# Patient Record
Sex: FEMALE | Race: BLACK OR AFRICAN AMERICAN | NOT HISPANIC OR LATINO
[De-identification: names, ages, dates, MRNs, and addresses within clinical notes are randomized per-mention and may not be internally consistent; named-entity substitution may affect disease eponyms.]

---

## 2017-04-17 ENCOUNTER — TRANSCRIPTION ENCOUNTER (OUTPATIENT)
Age: 62
End: 2017-04-17

## 2018-12-29 ENCOUNTER — TRANSCRIPTION ENCOUNTER (OUTPATIENT)
Age: 63
End: 2018-12-29

## 2019-01-08 ENCOUNTER — APPOINTMENT (OUTPATIENT)
Dept: OTOLARYNGOLOGY | Facility: CLINIC | Age: 64
End: 2019-01-08
Payer: COMMERCIAL

## 2019-01-08 VITALS
TEMPERATURE: 98.3 F | SYSTOLIC BLOOD PRESSURE: 159 MMHG | WEIGHT: 199 LBS | DIASTOLIC BLOOD PRESSURE: 93 MMHG | OXYGEN SATURATION: 98 % | RESPIRATION RATE: 17 BRPM | HEIGHT: 65 IN | HEART RATE: 106 BPM | BODY MASS INDEX: 33.15 KG/M2

## 2019-01-08 DIAGNOSIS — H92.02 OTALGIA, LEFT EAR: ICD-10-CM

## 2019-01-08 DIAGNOSIS — H75.82 OTHER SPECIFIED DISORDERS OF LEFT MIDDLE EAR AND MASTOID IN DISEASES CLASSIFIED ELSEWHERE: ICD-10-CM

## 2019-01-08 DIAGNOSIS — H91.92 UNSPECIFIED HEARING LOSS, LEFT EAR: ICD-10-CM

## 2019-01-08 DIAGNOSIS — H93.8X2 OTHER SPECIFIED DISORDERS OF LEFT EAR: ICD-10-CM

## 2019-01-08 DIAGNOSIS — Z86.39 PERSONAL HISTORY OF OTHER ENDOCRINE, NUTRITIONAL AND METABOLIC DISEASE: ICD-10-CM

## 2019-01-08 DIAGNOSIS — Z86.79 PERSONAL HISTORY OF OTHER DISEASES OF THE CIRCULATORY SYSTEM: ICD-10-CM

## 2019-01-08 DIAGNOSIS — Z78.9 OTHER SPECIFIED HEALTH STATUS: ICD-10-CM

## 2019-01-08 DIAGNOSIS — H66.90 OTITIS MEDIA, UNSPECIFIED, UNSPECIFIED EAR: ICD-10-CM

## 2019-01-08 PROCEDURE — 99203 OFFICE O/P NEW LOW 30 MIN: CPT | Mod: 25

## 2019-01-08 PROCEDURE — 92511 NASOPHARYNGOSCOPY: CPT

## 2019-01-08 PROCEDURE — 92557 COMPREHENSIVE HEARING TEST: CPT

## 2019-01-08 PROCEDURE — 92550 TYMPANOMETRY & REFLEX THRESH: CPT

## 2019-01-08 RX ORDER — LEVOFLOXACIN 500 MG/1
500 TABLET, FILM COATED ORAL DAILY
Qty: 10 | Refills: 0 | Status: ACTIVE | COMMUNITY
Start: 2019-01-08 | End: 1900-01-01

## 2019-01-08 RX ORDER — AMOXICILLIN AND CLAVULANATE POTASSIUM 875; 125 MG/1; 1/1
875-125 TABLET, FILM COATED ORAL
Refills: 0 | Status: ACTIVE | COMMUNITY

## 2019-01-08 RX ORDER — FLUTICASONE PROPIONATE 50 UG/1
50 SPRAY, METERED NASAL DAILY
Qty: 1 | Refills: 3 | Status: ACTIVE | COMMUNITY
Start: 2019-01-08 | End: 1900-01-01

## 2019-01-08 NOTE — HISTORY OF PRESENT ILLNESS
[de-identified] : 64 yo woman with left otalgia and hl for 1.5 weeks. went to e.r. and given augmentin and no dropsna dis now beginning to feel pain in the right ear. -f.s.c she is sure she has hl. no tinnitus or vertigo. has not had this in the past. nose is mildly congested. no h/o sinusitis.

## 2019-01-08 NOTE — PROCEDURE
[Topical Lidocaine] : topical lidocaine [Oxymetazoline HCl] : oxymetazoline HCl [Flexible Endoscope] : examined with the flexible endoscope [Serial Number: ___] : Serial Number: [unfilled] [FreeTextEntry6] : done to ro npx mass with referred ear pain\par minor engorgeemnt in npx\par could not see with speculum

## 2019-01-10 ENCOUNTER — FORM ENCOUNTER (OUTPATIENT)
Age: 64
End: 2019-01-10

## 2019-01-11 ENCOUNTER — APPOINTMENT (OUTPATIENT)
Dept: CT IMAGING | Facility: HOSPITAL | Age: 64
End: 2019-01-11
Payer: COMMERCIAL

## 2019-01-11 ENCOUNTER — OUTPATIENT (OUTPATIENT)
Dept: OUTPATIENT SERVICES | Facility: HOSPITAL | Age: 64
LOS: 1 days | End: 2019-01-11
Payer: COMMERCIAL

## 2019-01-11 PROCEDURE — 70486 CT MAXILLOFACIAL W/O DYE: CPT | Mod: 26

## 2019-01-11 PROCEDURE — 70486 CT MAXILLOFACIAL W/O DYE: CPT

## 2019-01-16 ENCOUNTER — APPOINTMENT (OUTPATIENT)
Dept: OTOLARYNGOLOGY | Facility: CLINIC | Age: 64
End: 2019-01-16
Payer: COMMERCIAL

## 2019-01-16 VITALS
HEART RATE: 81 BPM | OXYGEN SATURATION: 97 % | DIASTOLIC BLOOD PRESSURE: 82 MMHG | TEMPERATURE: 98.2 F | SYSTOLIC BLOOD PRESSURE: 132 MMHG

## 2019-01-16 DIAGNOSIS — H70.12 CHRONIC MASTOIDITIS, LEFT EAR: ICD-10-CM

## 2019-01-16 PROCEDURE — 99214 OFFICE O/P EST MOD 30 MIN: CPT

## 2019-01-16 NOTE — DATA REVIEWED
[de-identified] : ctr images reviewed witgh ptr - l mastoid partially opacified; middle ear is clear r ok sinuses ok ds

## 2019-01-16 NOTE — HISTORY OF PRESENT ILLNESS
[de-identified] : followup 62 yo woman who had sinus and ear infx - she tok augmentin and feels somewhat better but still hgas some discomfor behind her left ar and is ehre ot check. -f/s/c sx present a few weeks. mild-moderate.

## 2020-01-15 ENCOUNTER — TRANSCRIPTION ENCOUNTER (OUTPATIENT)
Age: 65
End: 2020-01-15

## 2020-04-25 ENCOUNTER — MESSAGE (OUTPATIENT)
Age: 65
End: 2020-04-25

## 2020-05-07 LAB
SARS-COV-2 IGG SERPL IA-ACNC: 0.1 INDEX
SARS-COV-2 IGG SERPL QL IA: NEGATIVE

## 2020-12-21 PROBLEM — H66.90 EAR INFECTION: Status: RESOLVED | Noted: 2019-01-08 | Resolved: 2020-12-21

## 2021-03-13 ENCOUNTER — EMERGENCY (EMERGENCY)
Facility: HOSPITAL | Age: 66
LOS: 1 days | Discharge: HOME | End: 2021-03-13
Attending: EMERGENCY MEDICINE | Admitting: EMERGENCY MEDICINE
Payer: COMMERCIAL

## 2021-03-13 VITALS
HEIGHT: 65 IN | WEIGHT: 209 LBS | SYSTOLIC BLOOD PRESSURE: 182 MMHG | DIASTOLIC BLOOD PRESSURE: 80 MMHG | TEMPERATURE: 99 F | OXYGEN SATURATION: 98 % | RESPIRATION RATE: 19 BRPM | HEART RATE: 71 BPM

## 2021-03-13 VITALS
SYSTOLIC BLOOD PRESSURE: 146 MMHG | DIASTOLIC BLOOD PRESSURE: 83 MMHG | RESPIRATION RATE: 18 BRPM | OXYGEN SATURATION: 96 % | HEART RATE: 76 BPM

## 2021-03-13 DIAGNOSIS — R42 DIZZINESS AND GIDDINESS: ICD-10-CM

## 2021-03-13 LAB
ALBUMIN SERPL ELPH-MCNC: 4.5 G/DL — SIGNIFICANT CHANGE UP (ref 3.3–5)
ALP SERPL-CCNC: 119 U/L — SIGNIFICANT CHANGE UP (ref 40–120)
ALT FLD-CCNC: 13 U/L — SIGNIFICANT CHANGE UP (ref 10–45)
ANION GAP SERPL CALC-SCNC: 11 MMOL/L — SIGNIFICANT CHANGE UP (ref 5–17)
AST SERPL-CCNC: 16 U/L — SIGNIFICANT CHANGE UP (ref 10–40)
BASOPHILS # BLD AUTO: 0.03 K/UL — SIGNIFICANT CHANGE UP (ref 0–0.2)
BASOPHILS NFR BLD AUTO: 0.4 % — SIGNIFICANT CHANGE UP (ref 0–2)
BILIRUB SERPL-MCNC: 0.4 MG/DL — SIGNIFICANT CHANGE UP (ref 0.2–1.2)
BUN SERPL-MCNC: 17 MG/DL — SIGNIFICANT CHANGE UP (ref 7–23)
CALCIUM SERPL-MCNC: 9.8 MG/DL — SIGNIFICANT CHANGE UP (ref 8.4–10.5)
CHLORIDE SERPL-SCNC: 104 MMOL/L — SIGNIFICANT CHANGE UP (ref 96–108)
CO2 SERPL-SCNC: 27 MMOL/L — SIGNIFICANT CHANGE UP (ref 22–31)
CREAT SERPL-MCNC: 0.76 MG/DL — SIGNIFICANT CHANGE UP (ref 0.5–1.3)
EOSINOPHIL # BLD AUTO: 0.18 K/UL — SIGNIFICANT CHANGE UP (ref 0–0.5)
EOSINOPHIL NFR BLD AUTO: 2.2 % — SIGNIFICANT CHANGE UP (ref 0–6)
GLUCOSE SERPL-MCNC: 132 MG/DL — HIGH (ref 70–99)
HCT VFR BLD CALC: 43.5 % — SIGNIFICANT CHANGE UP (ref 34.5–45)
HGB BLD-MCNC: 13.9 G/DL — SIGNIFICANT CHANGE UP (ref 11.5–15.5)
IMM GRANULOCYTES NFR BLD AUTO: 0.4 % — SIGNIFICANT CHANGE UP (ref 0–1.5)
LYMPHOCYTES # BLD AUTO: 1.97 K/UL — SIGNIFICANT CHANGE UP (ref 1–3.3)
LYMPHOCYTES # BLD AUTO: 24.2 % — SIGNIFICANT CHANGE UP (ref 13–44)
MAGNESIUM SERPL-MCNC: 2.2 MG/DL — SIGNIFICANT CHANGE UP (ref 1.6–2.6)
MCHC RBC-ENTMCNC: 28.8 PG — SIGNIFICANT CHANGE UP (ref 27–34)
MCHC RBC-ENTMCNC: 32 GM/DL — SIGNIFICANT CHANGE UP (ref 32–36)
MCV RBC AUTO: 90.1 FL — SIGNIFICANT CHANGE UP (ref 80–100)
MONOCYTES # BLD AUTO: 0.64 K/UL — SIGNIFICANT CHANGE UP (ref 0–0.9)
MONOCYTES NFR BLD AUTO: 7.9 % — SIGNIFICANT CHANGE UP (ref 2–14)
NEUTROPHILS # BLD AUTO: 5.3 K/UL — SIGNIFICANT CHANGE UP (ref 1.8–7.4)
NEUTROPHILS NFR BLD AUTO: 64.9 % — SIGNIFICANT CHANGE UP (ref 43–77)
NRBC # BLD: 0 /100 WBCS — SIGNIFICANT CHANGE UP (ref 0–0)
PLATELET # BLD AUTO: 239 K/UL — SIGNIFICANT CHANGE UP (ref 150–400)
POTASSIUM SERPL-MCNC: 4.2 MMOL/L — SIGNIFICANT CHANGE UP (ref 3.5–5.3)
POTASSIUM SERPL-SCNC: 4.2 MMOL/L — SIGNIFICANT CHANGE UP (ref 3.5–5.3)
PROT SERPL-MCNC: 7.8 G/DL — SIGNIFICANT CHANGE UP (ref 6–8.3)
RBC # BLD: 4.83 M/UL — SIGNIFICANT CHANGE UP (ref 3.8–5.2)
RBC # FLD: 13.5 % — SIGNIFICANT CHANGE UP (ref 10.3–14.5)
SODIUM SERPL-SCNC: 142 MMOL/L — SIGNIFICANT CHANGE UP (ref 135–145)
TROPONIN T SERPL-MCNC: <0.01 NG/ML — SIGNIFICANT CHANGE UP (ref 0–0.01)
WBC # BLD: 8.15 K/UL — SIGNIFICANT CHANGE UP (ref 3.8–10.5)
WBC # FLD AUTO: 8.15 K/UL — SIGNIFICANT CHANGE UP (ref 3.8–10.5)

## 2021-03-13 PROCEDURE — 82962 GLUCOSE BLOOD TEST: CPT

## 2021-03-13 PROCEDURE — 70450 CT HEAD/BRAIN W/O DYE: CPT | Mod: 26,MA

## 2021-03-13 PROCEDURE — 96360 HYDRATION IV INFUSION INIT: CPT | Mod: XU

## 2021-03-13 PROCEDURE — 80053 COMPREHEN METABOLIC PANEL: CPT

## 2021-03-13 PROCEDURE — 36415 COLL VENOUS BLD VENIPUNCTURE: CPT

## 2021-03-13 PROCEDURE — 70487 CT MAXILLOFACIAL W/DYE: CPT

## 2021-03-13 PROCEDURE — 99284 EMERGENCY DEPT VISIT MOD MDM: CPT | Mod: 25

## 2021-03-13 PROCEDURE — 84484 ASSAY OF TROPONIN QUANT: CPT

## 2021-03-13 PROCEDURE — 70487 CT MAXILLOFACIAL W/DYE: CPT | Mod: 26,MA

## 2021-03-13 PROCEDURE — 99285 EMERGENCY DEPT VISIT HI MDM: CPT

## 2021-03-13 PROCEDURE — 93005 ELECTROCARDIOGRAM TRACING: CPT

## 2021-03-13 PROCEDURE — 83735 ASSAY OF MAGNESIUM: CPT

## 2021-03-13 PROCEDURE — 85025 COMPLETE CBC W/AUTO DIFF WBC: CPT

## 2021-03-13 PROCEDURE — 70450 CT HEAD/BRAIN W/O DYE: CPT

## 2021-03-13 PROCEDURE — 93010 ELECTROCARDIOGRAM REPORT: CPT

## 2021-03-13 RX ORDER — MECLIZINE HCL 12.5 MG
25 TABLET ORAL ONCE
Refills: 0 | Status: COMPLETED | OUTPATIENT
Start: 2021-03-13 | End: 2021-03-13

## 2021-03-13 RX ORDER — FLUCONAZOLE 150 MG/1
1 TABLET ORAL
Qty: 1 | Refills: 0
Start: 2021-03-13 | End: 2021-03-13

## 2021-03-13 RX ORDER — MECLIZINE HCL 12.5 MG
1 TABLET ORAL
Qty: 10 | Refills: 0
Start: 2021-03-13

## 2021-03-13 RX ORDER — SODIUM CHLORIDE 9 MG/ML
1000 INJECTION INTRAMUSCULAR; INTRAVENOUS; SUBCUTANEOUS ONCE
Refills: 0 | Status: COMPLETED | OUTPATIENT
Start: 2021-03-13 | End: 2021-03-13

## 2021-03-13 RX ADMIN — Medication 25 MILLIGRAM(S): at 08:01

## 2021-03-13 RX ADMIN — SODIUM CHLORIDE 1000 MILLILITER(S): 9 INJECTION INTRAMUSCULAR; INTRAVENOUS; SUBCUTANEOUS at 09:46

## 2021-03-13 RX ADMIN — SODIUM CHLORIDE 1000 MILLILITER(S): 9 INJECTION INTRAMUSCULAR; INTRAVENOUS; SUBCUTANEOUS at 08:19

## 2021-03-13 NOTE — ED ADULT NURSE NOTE - NSIMPLEMENTINTERV_GEN_ALL_ED
Implemented All Universal Safety Interventions:  [place] to call system. Call bell, personal items and telephone within reach. Instruct patient to call for assistance. Room bathroom lighting operational. Non-slip footwear when patient is off stretcher. Physically safe environment: no spills, clutter or unnecessary equipment. Stretcher in lowest position, wheels locked, appropriate side rails in place.

## 2021-03-13 NOTE — ED PROVIDER NOTE - NSFOLLOWUPINSTRUCTIONS_ED_ALL_ED_FT
Please follow up with your primary physician in 1-2 days for re evaluation.  Please return to ER immediately should your symptoms worsen or if you have any concern prior to this recommended follow up.          Dizziness    WHAT YOU NEED TO KNOW:    Dizziness is a feeling of being off balance or unsteady. Common causes of dizziness are an inner ear fluid imbalance or a lack of oxygen in your blood. Dizziness may be acute (lasts 3 days or less) or chronic (lasts longer than 3 days). You may have dizzy spells that last from seconds to a few hours.     DISCHARGE INSTRUCTIONS:    Return to the emergency department if:   •You have a headache and a stiff neck.      •You have shaking chills and a fever.       •You vomit over and over with no relief.       •Your vomit or bowel movements are red or black.       •You have pain in your chest, back, or abdomen.       •You have numbness, especially in your face, arms, or legs.       •You have trouble moving your arms or legs.       •You are confused.       Contact your healthcare provider if:   •You have a fever.       •Your symptoms do not get better with treatment.       •You have questions or concerns about your condition or care.       Manage your symptoms:   •Do not drive or operate heavy machinery when you are dizzy.       •Get up slowly from sitting or lying down.       •Drink plenty of liquids. Liquids help prevent dehydration. Ask how much liquid to drink each day and which liquids are best for you.      Follow up with your healthcare provider as directed: Write down your questions so you remember to ask them during your visits.        © Copyright [employer] 2021           back to top                          © Copyright [employer] 2021

## 2021-03-13 NOTE — ED PROVIDER NOTE - PATIENT PORTAL LINK FT
You can access the FollowMyHealth Patient Portal offered by [facility] by registering at the following website: [email or web address]. By joining [facility]’s FollowMyHealth portal, you will also be able to view your health information using other applications (apps) compatible with our system.

## 2021-03-13 NOTE — ED ADULT NURSE REASSESSMENT NOTE - NS ED NURSE REASSESS COMMENT FT1
Pt received from night RN, denies chest pain, SOB, palpitations or dizziness at this time. Safety precautions in place, IV placed, awaiting CT scan.

## 2021-03-13 NOTE — ED PROVIDER NOTE - CLINICAL SUMMARY MEDICAL DECISION MAKING FREE TEXT BOX
Patient presents to ED with concern for dizziness with positional changes several times throughout the evening while at work overnight.  Patient denies dizziness currently or any additional symptoms on arrival to ED.  EKG, labs, imaging reviewed and all results are discussed with patient.  She is given IVF bolus, antivert and monitored in ED.  Patient continues to feel well in ED, remains asymptomatic.  Will plan for discharge home with instruction for prompt PCP and ENT follow up as discussed.  She is also requesting neuro follow up information.  All follow up info provided.  Will also provide rx for antivert and patient is educated as to how to take medication.  Patient is advised to follow up with their PCP in 1-2 days without fail.  Patient instructed to return to ED immediately should their symptoms worsen or if there is any concern prior to the recommended PCP follow up.  Patient is aware of plan and verbalizes their understanding.  Will discharge home at this time.

## 2021-03-13 NOTE — ED PROVIDER NOTE - NS ED ROS FT
Constitutional: No fever or chills.   Eyes: No pain, blurry vision, or discharge.  ENMT: + Right ear pain, no sore throat. No neck pain or stiffness.  Cardiac: No chest pain, SOB or edema. No chest pain with exertion.  Respiratory: No cough or respiratory distress. No hemoptysis. No history of asthma or RAD.  GI: No nausea, vomiting, diarrhea or abdominal pain.  : No dysuria, frequency or burning.  MS: No myalgia, muscle weakness, joint pain or back pain.  Neuro: + Dizziness.  No headache or weakness. No LOC.  Skin: No skin rash.   Endocrine: No history of thyroid disease or diabetes.  Except as documented in the HPI, all other systems are negative.

## 2021-03-13 NOTE — ED PROVIDER NOTE - OBJECTIVE STATEMENT
65 year old female with history of HTN presents to ED with concern for feeling dizzy with positional changes today while at work as well as right ear discomfort.  Patient 65 year old female with history of HTN presents to ED with concern for feeling dizzy with positional changes today while at work as well as right ear discomfort.  Patient notes dizziness upon moving from sitting to standing x 2-3 episodes overnight while at work.  She denies fever, chills, visual changes, chest pain, shortness of breath, abdominal pain, nausea, emesis, changes to bowel movements, peripheral edema, rashes, recent travel, lateralizing weakness or additional acute complaints or concerns at this time.

## 2021-03-13 NOTE — ED ADULT NURSE NOTE - OBJECTIVE STATEMENT
Per pt, c/o dizziness as she lays down. Denied SOB, palpitations, chest pain.  EKG done and read.  Pt offers no c/o dizziness as she walks.

## 2021-03-13 NOTE — ED PROVIDER NOTE - CARE PROVIDER_API CALL
Jen Walsh)  Otolaryngology  [address] [place] [address]  Phone: [phone]  Fax: [phone]  Follow Up Time:     Garrett Mancini)  Neurology; Vascular Neurology  [address], [address] [place] [id number]  Phone: [phone]  Fax: [phone]  Follow Up Time:

## 2021-03-13 NOTE — ED PROVIDER NOTE - PRO INTERPRETER NEED 2
Appt made.        Future Appointments   Date Time Provider Jose Schofield   6/3/2020  4:00 PM Michela Conway MD [facility]
Dr. Chase Felix, at time of this note, you have a 4 PM appt on 6/3 (next Wed). Do you want the pt to be f/u in office so you can listen to lungs or was this just to be a video visit?
From: Scott Samayoa  To: Raul Farias MD  Sent: 5/28/2020 3:28 PM CDT  Subject: Visit Amy Son Dr. Micky Batista you told me to book an appointment with you to review my test result From the pneumonia as a result of COVID-19.  You currently
Please book that appointment.
English

## 2021-03-13 NOTE — ED PROVIDER NOTE - PHYSICAL EXAMINATION
VITAL SIGNS: I have reviewed nursing notes and confirm.  CONSTITUTIONAL: Well-developed; well-nourished; in no acute distress.   SKIN:  Warm and dry, no acute rash.   HEAD:  normocephalic, atraumatic.  EYES: PERRL.  EOM intact; conjunctiva and sclera clear.  No nystagmus.  ENT: No nasal discharge; airway clear.   NECK: Supple; non tender.  CARD: S1, S2 normal; no murmurs, gallops, or rubs. Regular rate and rhythm.   RESP:  Clear to auscultation b/l, no wheezes, rales or rhonchi.  ABD: Normal bowel sounds; soft; non-distended; non-tender; no guarding/rebound.  EXT: Normal ROM. No clubbing, cyanosis or edema. 2+ pulses to b/l ue/le.  NEURO: Alert, oriented, grossly unremarkable.  5/5 strength x 4 extremities against gravity and external force.  No drift x 4 extremities.  Sensation intact and symmetric x 4 extremities.  No facial asymmetry.    PSYCH: Cooperative, mood and affect appropriate. VITAL SIGNS: I have reviewed nursing notes and confirm.  CONSTITUTIONAL: Well-developed; well-nourished; in no acute distress.   SKIN:  Warm and dry, no acute rash.   HEAD:  normocephalic, atraumatic.  EYES: PERRL.  EOM intact; conjunctiva and sclera clear.  No nystagmus.  ENT: No nasal discharge; airway clear.  TMs and EACs clear, bilaterally.    NECK: Supple; non tender.  CARD: S1, S2 normal; no murmurs, gallops, or rubs. Regular rate and rhythm.   RESP:  Clear to auscultation b/l, no wheezes, rales or rhonchi.  ABD: Normal bowel sounds; soft; non-distended; non-tender; no guarding/rebound.  EXT: Normal ROM. No clubbing, cyanosis or edema. 2+ pulses to b/l ue/le.  NEURO: Alert, oriented, grossly unremarkable.  5/5 strength x 4 extremities against gravity and external force.  No drift x 4 extremities.  Sensation intact and symmetric x 4 extremities.  No facial asymmetry.    PSYCH: Cooperative, mood and affect appropriate.

## 2021-03-13 NOTE — ED ADULT TRIAGE NOTE - OTHER COMPLAINTS
CC of dizziness upon laying down accompanied with ear pain R. no facial droop, denies weakness no blurry vision

## 2021-03-13 NOTE — ED PROVIDER NOTE - CARE PROVIDERS DIRECT ADDRESSES
[email or web address]

## 2021-03-15 ENCOUNTER — TRANSCRIPTION ENCOUNTER (OUTPATIENT)
Age: 66
End: 2021-03-15

## 2022-06-01 ENCOUNTER — NON-APPOINTMENT (OUTPATIENT)
Age: 67
End: 2022-06-01

## 2022-06-01 ENCOUNTER — APPOINTMENT (OUTPATIENT)
Dept: ORTHOPEDIC SURGERY | Facility: CLINIC | Age: 67
End: 2022-06-01
Payer: COMMERCIAL

## 2022-06-01 VITALS — BODY MASS INDEX: 33.63 KG/M2 | HEIGHT: 64 IN | WEIGHT: 197 LBS

## 2022-06-01 DIAGNOSIS — M25.511 PAIN IN RIGHT SHOULDER: ICD-10-CM

## 2022-06-01 DIAGNOSIS — G89.29 PAIN IN RIGHT SHOULDER: ICD-10-CM

## 2022-06-01 DIAGNOSIS — M25.512 PAIN IN RIGHT SHOULDER: ICD-10-CM

## 2022-06-01 PROCEDURE — 20610 DRAIN/INJ JOINT/BURSA W/O US: CPT | Mod: RT

## 2022-06-01 PROCEDURE — 99204 OFFICE O/P NEW MOD 45 MIN: CPT | Mod: 25

## 2022-06-01 PROCEDURE — 73030 X-RAY EXAM OF SHOULDER: CPT | Mod: 50

## 2022-06-01 NOTE — DISCUSSION/SUMMARY
[de-identified] : 66-year-old female with bilateral glenohumeral osteoarthritis.  I discussed the treatment of degenerative arthritis with the patient at length today. I described the spectrum of treatment from nonoperative modalities to total joint arthroplasty. Noninvasive and nonoperative treatment modalities include weight reduction, activity modification with low impact exercise, PRN use of acetaminophen or anti-inflammatory medication if tolerated, glucosamine/chondroitin supplements, and physical therapy. Further treatments can include corticosteroid injection and the use of hyaluronic acid injections. Definitive treatment can certainly include total joint arthroplasty also. The risks and benefits of each treatment options was discussed and all questions were answered.\par \par Injection: Right glenohumeral joint.\par Indication: Arthritis.\par \par A discussion was had with the patient regarding this procedure and all questions were answered. All risks, benefits and alternatives were discussed. These included but were not limited to bleeding, infection, and allergic reaction. Alcohol was used to clean the skin, and betadine was used to sterilize and prep the area in the posterior aspect of the right shoulder. Ethyl chloride spray was then used as a topical anesthetic. A 21-gauge needle was used to inject 7cc of 1% lidocaine and 1cc of 40mg/ml methylprednisolone into the right glenohumeral joint. A sterile bandage was then applied. The patient tolerated the procedure well and there were no complications. \par \par Injection: Left glenohumeral joint.\par Indication: Arthritis.\par \par A discussion was had with the patient regarding this procedure and all questions were answered. All risks, benefits and alternatives were discussed. These included but were not limited to bleeding, infection, and allergic reaction. Alcohol was used to clean the skin, and betadine was used to sterilize and prep the area in the posterior aspect of the left shoulder. Ethyl chloride spray was then used as a topical anesthetic. A 21-gauge needle was used to inject 7cc of 1% lidocaine and 1cc of 40mg/ml methylprednisolone into the left glenohumeral joint. A sterile bandage was then applied. The patient tolerated the procedure well and there were no complications. \par \par Given prescription for Mobic side effects discussed follow-up as needed.  All questions answered

## 2022-06-01 NOTE — PHYSICAL EXAM
[de-identified] : General Exam\par \par Well developed, well nourished\par No apparent distress\par Oriented to person, place, and time\par Mood: Normal\par Affect: Normal\par Balance and coordination: Normal\par Gait: Normal\par \par Left shoulder exam\par \par Inspection: No swelling, ecchymosis or gross deformity.\par Skin: No masses, No lesions\par Tenderness: No bicipital tenderness, no tenderness to the greater tuberosity/RTC insertion, no anterior shoulder/lesser tuberosity tenderness. No tenderness SC joint, clavicle, AC joint.\par ROM: 140/40/L1\par Impingement tests: Positive Thakur\par AC Joint: no pain with cross arm testing\par Biceps: Negative speed\par Strength: 5/5 abduction, external rotation, and internal rotation\par Neuro: AIN, PIN, Ulnar nerve motor intact\par Sensation: Intact to light touch in radial, median, ulnar, and axillary nerve distributions\par Vasc: 2+ radial pulse\par \par Right shoulder exam\par \par Inspection: No swelling, ecchymosis or gross deformity.\par Skin: No masses, No lesions\par Tenderness: No bicipital tenderness, no tenderness to the greater tuberosity/RTC insertion, no anterior shoulder/lesser tuberosity tenderness. No tenderness SC joint, clavicle, AC joint.\par ROM: 140/40/L5\par Impingement tests: Positive Thakur\par AC Joint: no pain with cross arm testing\par Biceps: Negative speed\par Strength: 5/5 abduction, external rotation, and internal rotation\par Neuro: AIN, PIN, Ulnar nerve motor intact\par Sensation: Intact to light touch in radial, median, ulnar, and axillary nerve distributions\par Vasc: 2+ radial pulse\par  [de-identified] : \par The following radiographs were ordered and read by me during this patients visit. I reviewed each radiograph in detail with the patient and discussed the findings as highlighted below. \par \par 3 views of both shoulders were obtained today.  There is mild to moderate glenohumeral arthritis with slight joint space narrowing.  There is no fracture.

## 2022-06-01 NOTE — HISTORY OF PRESENT ILLNESS
[de-identified] : 65yo female presents complaining of bilateral shoulder pain right worse than left for several months.  She is a nurse at [employer].  She reports pain lateral aspect both shoulders radiates into the elbows.  No injuries or trauma.  She has pain with overhead activity and reaching behind back.  She tried to rest, using ice.  Denies numbness tingling\par \par The patient's past medical history, past surgical history, medications, allergies, and social history were reviewed by me today with the patient and documented accordingly. In addition, the patient's family history, which is noncontributory to this visit, was also reviewed.\par

## 2022-07-28 ENCOUNTER — RX RENEWAL (OUTPATIENT)
Age: 67
End: 2022-07-28

## 2022-09-27 ENCOUNTER — APPOINTMENT (OUTPATIENT)
Dept: RADIOLOGY | Facility: HOSPITAL | Age: 67
End: 2022-09-27

## 2022-09-30 ENCOUNTER — NON-APPOINTMENT (OUTPATIENT)
Age: 67
End: 2022-09-30

## 2022-10-01 RX ORDER — MELOXICAM 15 MG/1
15 TABLET ORAL
Qty: 30 | Refills: 1 | Status: ACTIVE | COMMUNITY
Start: 2022-10-01 | End: 1900-01-01

## 2023-02-08 ENCOUNTER — APPOINTMENT (OUTPATIENT)
Dept: FAMILY MEDICINE | Facility: CLINIC | Age: 68
End: 2023-02-08

## 2024-01-06 ENCOUNTER — NON-APPOINTMENT (OUTPATIENT)
Age: 69
End: 2024-01-06

## 2025-08-14 ENCOUNTER — RESULT REVIEW (OUTPATIENT)
Age: 70
End: 2025-08-14